# Patient Record
Sex: MALE | Race: WHITE
[De-identification: names, ages, dates, MRNs, and addresses within clinical notes are randomized per-mention and may not be internally consistent; named-entity substitution may affect disease eponyms.]

---

## 2018-02-13 ENCOUNTER — HOSPITAL ENCOUNTER (OUTPATIENT)
Dept: HOSPITAL 80 - FED | Age: 33
Setting detail: OBSERVATION
LOS: 2 days | Discharge: HOME | End: 2018-02-15
Attending: INTERNAL MEDICINE | Admitting: INTERNAL MEDICINE
Payer: COMMERCIAL

## 2018-02-13 DIAGNOSIS — E87.2: Primary | ICD-10-CM

## 2018-02-13 DIAGNOSIS — K76.0: ICD-10-CM

## 2018-02-13 DIAGNOSIS — E87.6: ICD-10-CM

## 2018-02-13 DIAGNOSIS — E86.9: ICD-10-CM

## 2018-02-13 DIAGNOSIS — F10.230: ICD-10-CM

## 2018-02-13 DIAGNOSIS — N17.9: ICD-10-CM

## 2018-02-13 DIAGNOSIS — E80.6: ICD-10-CM

## 2018-02-13 DIAGNOSIS — D72.829: ICD-10-CM

## 2018-02-13 DIAGNOSIS — K70.10: ICD-10-CM

## 2018-02-13 DIAGNOSIS — R55: ICD-10-CM

## 2018-02-13 LAB — PLATELET # BLD: 163 10^3/UL (ref 150–400)

## 2018-02-13 PROCEDURE — 76700 US EXAM ABDOM COMPLETE: CPT

## 2018-02-13 PROCEDURE — G0378 HOSPITAL OBSERVATION PER HR: HCPCS

## 2018-02-13 PROCEDURE — 36556 INSERT NON-TUNNEL CV CATH: CPT

## 2018-02-13 PROCEDURE — 06HM33Z INSERTION OF INFUSION DEVICE INTO RIGHT FEMORAL VEIN, PERCUTANEOUS APPROACH: ICD-10-PCS | Performed by: SURGERY

## 2018-02-13 PROCEDURE — 5A1D70Z PERFORMANCE OF URINARY FILTRATION, INTERMITTENT, LESS THAN 6 HOURS PER DAY: ICD-10-PCS | Performed by: INTERNAL MEDICINE

## 2018-02-13 PROCEDURE — 97161 PT EVAL LOW COMPLEX 20 MIN: CPT

## 2018-02-13 PROCEDURE — G0480 DRUG TEST DEF 1-7 CLASSES: HCPCS

## 2018-02-13 PROCEDURE — 74018 RADEX ABDOMEN 1 VIEW: CPT

## 2018-02-13 PROCEDURE — 93005 ELECTROCARDIOGRAM TRACING: CPT

## 2018-02-13 NOTE — GHP
[f rep st]



                                                            HISTORY AND PHYSICAL





DATE OF ADMISSION:  02/13/2018



CHIEF COMPLAINT:  Feeling poorly.



HISTORY OF PRESENT ILLNESS:  This is a 32-year-old man who comes in after having had a questionable s
eizure at [employer].  History evolved slowly throughout his ER course.  He is currently admitting to [place] to a party on Saturday night, drinking some whiskey, as well as some moonshine.  The day after, he
 started to feel somewhat poorly at night.  He continued to drink some whiskey for a going away party
.  Monday, he was unable to eat or drink and felt quite nauseous.  Today, he was wobbly in [employer] and
 passed out and had a seizure.  He did not, however, bite his tongue or lose control of his bowel or 
bladder.  He is unclear whether he had a true tonic-colonic seizure.  Per ED report, bystander saw conrado morton.  He is told that he has neurocardiogenic syncope.  He has had about 3 episodes in the pas
t.



PAST MEDICAL/SURGICAL HISTORY:  Neurocardiogenic syncope.



MEDICATIONS:  None.



ALLERGIES:  No known drug allergies.



FAMILY HISTORY:  His grandmother has Alzheimer dementia.



SOCIAL HISTORY:  Drinks as above.  He does not smoke.



REVIEW OF SYSTEMS:  A 10-point review of systems is conducted and is negative except per HPI.



PHYSICAL EXAM:  VITAL SIGNS:  Blood pressure is 153/101, heart rate has ranged between 111-139, respi
ration rate is 18, saturating 98% on room air, temperature 36.8.  GENERAL:  The patient is a pleasant
 man who appears quite uncomfortable, somewhat tremulous.  HEENT:  Shows him to be normocephalic, atr
aumatic.  CARDIOVASCULAR:  Shows regular rate and rhythm other than being tachycardic.  No murmurs, r
ubs, or gallops.  PULMONARY:  Shows lungs clear to auscultation bilaterally.  ABDOMEN:  Soft, nontend
er, nondistended.  :  Shows a right femoral line dialysis catheter.  SKIN:  Shows no rash.  NEUROLO
GIC:  Shows him to be alert and oriented x3.  He is moving all extremities.  PSYCHIATRIC:  Shows norm
al mood and affect.



LABS:  White count of 17, hemoglobin is 18.  Lactate is 4.8.  Sodium 131 potassium 3.3.  Initial bica
rb was 6 with a gap of 49.  Initial creatinine was 1.4.  Bilirubin was 9.9, which is down to 8.2.  Th
is is mostly unconjugated.  LFTs were 131 and 88, down to 98 and 73.  Initial lipase was 447.



DATA:  

1.  I discussed this with Dr. Flores.

2.  Reviewed, discussed this with Dr. Ryan.  Will plan on dialysis tonight.

3.  EKG, which I personally viewed and interpreted, shows sinus tachycardia.



IMPRESSION AND PLAN:  This is a 32-year-old man with an ingestion of toxic alcohol. 

1.  Methanol ingestion:  He meets criteria for dialysis.  Femoral catheter was placed by Dr. Daija ochoa.  Dr. Ryan will start dialysis tonight.  He was given a dose of fomepizole in the ED, will continu
e this starting after dialysis and then every 12 hours.  Tox case was open.  The number is [id number].

2.  Significant anion gap acidosis:  Likely toxic alcohol as well as lactate and suspected ketoacidos
is.  Markedly improved with IV fluids.  Should improve further with dialysis.

3.  Hyperbilirubinemia:  This is unconjugated.  It is down a little bit.  This may be Gilbert's in th
e setting of significant stress.  Right upper quadrant ultrasound is pending at this time.

4.  Possible seizure:  At this point, I am not really suspicious of a seizure.  He has many other elina
sons to have had syncope.  I would not start him empirically on antiepileptics.

5.  Leukocytosis:  Think that this is likely stress reaction.  I would not start empiric antibiotics 
at this point.  If this does not resolve tomorrow, would consider further infectious workup.

6.  Macrocytosis:  He may be drinking more than he is attesting to.  He is tremulous now, though I am
 not clear that this is withdrawal.  I will go ahead and place him on CIWA.



BILLING:  I spent 45 minutes at bedside in floor critical care time on this patient.





Job #:  [id number]

## 2018-02-13 NOTE — GOP
[f rep st]



                                                                OPERATIVE REPORT





DATE OF OPERATION:  



SURGEON:  José Cross MD



PREOPERATIVE DIAGNOSIS:  Methanol poisoning.



POSTOPERATIVE DIAGNOSIS:  Methanol poisoning.



PROCEDURE PERFORMED:  Right femoral vein catheterization with ultrasound guidance.



FINDINGS:  





INDICATIONS:  Patient is admitted with methanol poisoning needing dialysis for toxin removal.



DESCRIPTION OF PROCEDURE:  Patient positioned supine.  The groin shaved, prepped with ChloraPrep, river
ped in the usual sterile fashion.  Using full gown and gloves barrier, an ultrasound sterile probe is
 used to identify the femoral vein, which was in its normal medial location, and an 18-gauge thin-wal
led needle punctured this on the 1st pass, allowing guidewire to be introduced.  Serial dilators were
 passed and then the triple-lumen Mahurkar dialysis catheter placed.  All ports were aspirated free o
f blood, flushed with sodium chloride, and caps placed.  The catheter was sutured to the skin with in
terrupted nylon sutures and ChloraPrep again used to clean the site and facilitate a dressing.  The p
atient tolerated the procedure well.





Job #:  [id number]

## 2018-02-13 NOTE — EDPHY
H & P


Constitutional: 


 Initial Vital Signs











Temperature (C)  36.7 C   02/13/18 16:41


 


Heart Rate  143 H  02/13/18 16:41


 


Respiratory Rate  18   02/13/18 16:41


 


Blood Pressure  158/110 H  02/13/18 16:41


 


O2 Sat (%)  95   02/13/18 16:41








 











O2 Delivery Mode               Room Air














Allergies/Adverse Reactions: 


 





No Known Allergies Allergy (Unverified 02/13/18 16:43)


 








Home Medications: 














 Medication  Instructions  Recorded


 


NK [No Known Home Meds]  02/13/18














Medical Decision Making


ED Course/Re-evaluation: 





CHIEF COMPLAINT:  Possible seizure





HISTORY OF PRESENT ILLNESS:  The patient is a 31 y/o male with a history of 

seizures but not on antiepileptic medications arriving via EMS for evaluation 

of a possible seizure this afternoon. He reports his last seizure was in high 

school over 15 years ago. For the last day, he has been nauseated and vomits 

every time he eats. Today he remembers feeling lightheaded and thirsty before 

losing consciousness. Per bystanders to EMS, he was seen "wobbling" while in 

line at [employer]. It's unclear if he fell, had a seizure, or suffered any 

injuries during this time. He currently feels "a little woozy and really 

thirsty." He denies illicit drugs, prescription drugs, or significant alcohol 

use. No fever, chills, myalgias, abdominal pain, diarrhea, cough, cold 

symptoms. 





REVIEW OF SYSTEMS:  





A 10 point review of systems was performed and is negative with the exception 

of the elements mentioned in the history of present illness.





PHYSICAL EXAM:  





, BP, O2 Sat, RR.  Temp noted


General Appearance:  Alert, well hydrated, appropriate, tremulous and 

tachycardic.


Head:  Atraumatic without scalp tenderness or obvious injury


Eyes:  Pupils equal, round, reactive to light and accommodation, EOMI, no trauma

, no injection.


Nose:  Atraumatic, no rhinorrhea, clear.


Throat: Mucus membranes moist.


Neck:  Supple, nontender, no lymphadenopathy.


Respiratory:  No retractions, no distress, no wheezes, and no accessory muscle 

use.  Lungs are clear to auscultation bilaterally.


Cardiovascular:  Tachycardic regular rate and rhythm, no murmurs, rubs, or 

gallops. Good capillary refill all extremities.


Gastrointestinal:  Abdomen is soft, nontender, non-distended, no masses, no 

rebound, no guarding, no peritoneal signs.


Musculoskeletal:  Normal active ROM of all extremities, atraumatic.


Neurological:  Alert, appropriate, and interactive.  The patient has non-focal 

cranial nerves, motor, sensory, and cerebellar exam. Tremulous.


Skin:  No rashes, good turgor, no nodules on palpation.





Past medical history: Seizures, prior hypotension thought related to seizures 

and prescribed salt tablets for a while


Past surgical history: Noncontributory


Family history: Noncontributory


Social history: About to move out of state for a new job. 





DIAGNOSTICS/PROCEDURES/CRITICAL CARE TIME:  





Critical care time spent by me, Dr. Flores, exclusively with this patient was 

45 minutes, exclusive of PA time and exclusive of procedures.  The organ system 

at risk was nervous system. Time spent in assessment and serial reassessments, 

discussion with patient, consideration of interventions, review of labs, 

research of toxic alcohol syndrome and other causes for acidosis, and multiple 

consultations with internal medicine, toxicology, and nephrology.





DIFFERENTIAL DIAGNOSIS:   The differential diagnosis for the patient's seizure 

included but was not limited to methanol/ethylene glycol poisoning, electrolyte 

abnormality, alcohol withdrawal, medication noncompliance, head injury, CNS 

structural abnormality, and break through seizure.





MEDICAL DECISION MAKING:  





This is a 31 y/o male who presents with a 24-hour history of persistent 

vomiting and seizure this afternoon. He denies any history of alcohol abuse, 

prescription medications abuse, or other ingestions. He is tremulous and 

tachycardic around 150-160 upon arrival here. No evidence of CNS depression. 

Presentation is suspicious for alcohol withdrawal despite patient's denials. 

Plan for IV, labs, tox screen, EKG. IV fluids and 1mg IV Ativan administered. 





ISTAT shows abnormal chemistries with Na 132, K 2.9, chloride 88, and anion gap 

significantly elevated at 49. CO2 level is critically low at 6. He has 

hypochloremic and hypokalemic acidosis, but I would expect an alkalosis with 

hypovolemia and dehydration from vomiting. His creatinine is elevated at 1.4 

indicating renal insufficiency, his BGL is 153. Serum osmolality ordered. 





Serum osmolality is 293. My calculated osmolality gap is high at 17, which is 

concerning for possible methanol/ethylene glycol overdose. LFTs and bilirubin 

elevated. Aspirin, acetaminophen, EtOH levels pending.





1755: Reassessed patient and discussed lab results and my concern for toxic 

alcohol use. He reports consuming a few drinks of liquor during happy hour on 

Monday for a going away party. Consumed store-bought rum and some whiskey, 

denies toxic alcohol intake like moonshine, antifreeze, or any other 

ingestions. He has been vomiting since Monday. He had a mild headache earlier 

that has resolved. He denies abdominal pain. No history of diabetes. Continues 

to deny alcohol abuse. He continues to be tremulous and tachycardic on exam. 





1803: Consulted with Dr. Santos, hospitalist. Plan for venous lactate, repeat 

chemistries, US of gallbladder and liver, and admission. Dr. Santos accepts 

admission for severe anion gap metabolic acidosis, bilirubinemia, elevated 

liver enzymes, dehydration, seizure, hypochloremia, hypokalemia.





1809: Patient admitted to RN that he drank a roge jar full of moonshine 

Saturday and has consumed whiskey every day since then. No vision symptoms. He 

has likely partially treated his methanol poisoning with ongoing alcohol 

consumption.  and hospitalist paged.





1815: Ethanol, acetaminophen, aspirin levels are negative. 





1817: Consulted with Poison Control. Their toxicology fellow will call me back.





1820: Consulted with Dr. Jax Avila, . Case #[id number]. He advises 

Fomepizole with loading dose of 15mg/kg and dialysis to eliminate formic acid. 

Then 15mg/kg ever 12 hours afterwards until methanol level comes back below 30. 

Nephrology paged. Ethylene glycol and methanol serum levels ordered, though 

these will be send-out labs. 





1846: Consulted with Dr. Froylan Ryan, nephrology. He request placement of a 

dialysis catheter and ICU placement for emergency dialysis. 





1850: Consulted with Dr. Cross, surgeon. He will place ICU catheter 

upstairs. 





1858: Updated Dr. Santos on patient condition.





- Data Points


Laboratory Results: 


 Laboratory Results





 02/13/18 16:40 





 02/13/18 16:40 





 











  02/13/18 02/13/18 02/13/18





  16:40 16:40 16:40


 


WBC      





    


 


RBC      





    


 


Hgb      





    


 


POC Hgb      





    


 


Hct      





    


 


POC Hct      





    


 


MCV      





    


 


MCH      





    


 


MCHC      





    


 


RDW      





    


 


Plt Count      





    


 


MPV      





    


 


Neut % (Auto)      





    


 


Lymph % (Auto)      





    


 


Mono % (Auto)      





    


 


Eos % (Auto)      





    


 


Baso % (Auto)      





    


 


Nucleat RBC Rel Count      





    


 


Absolute Neuts (auto)      





    


 


Absolute Lymphs (auto)      





    


 


Absolute Monos (auto)      





    


 


Absolute Eos (auto)      





    


 


Absolute Basos (auto)      





    


 


Absolute Nucleated RBC      





    


 


Immature Gran %      





    


 


Immature Gran #      





    


 


POC Sodium      





    


 


Sodium      136 mEq/L mEq/L





     (135-145) 


 


POC Potassium      





    


 


Potassium      3.3 mEq/L L mEq/L





     (3.5-5.2) 


 


POC Chloride      





    


 


Chloride      81 mEq/L L mEq/L





     () 


 


Carbon Dioxide      6 mEq/l L* mEq/l





     (22-31) 


 


Anion Gap      49 mEq/L H mEq/L





     (8-16) 


 


POC BUN      





    


 


BUN      9 mg/dL mg/dL





     (7-23) 


 


Creatinine      1.4 mg/dL H mg/dL





     (0.7-1.3) 


 


POC Creatinine      





    


 


Estimated GFR      58 





    


 


Glucose      153 mg/dL H mg/dL





     () 


 


POC Glucose      





    


 


Serum Osmolality      293 mosmo/kg mosmo/kg





     (280-297) 


 


Calcium      10.4 mg/dL mg/dL





     (8.5-10.4) 


 


Total Bilirubin      9.9 mg/dL H mg/dL





     (0.1-1.4) 


 


Conjugated Bilirubin      2.8 mg/dL H mg/dL





     (0.0-0.5) 


 


Unconjugated Bilirubin      7.1 mg/dL H mg/dL





     (0.0-1.1) 


 


AST      131 IU/L H IU/L





     (17-59) 


 


ALT      88 IU/L H IU/L





     (21-72) 


 


Alkaline Phosphatase      128 IU/L H IU/L





     () 


 


Total Protein      9.0 g/dL H g/dL





     (6.3-8.2) 


 


Albumin      5.9 g/dL H g/dL





     (3.5-5.0) 


 


Lipase      447 IU/L H IU/L





     () 


 


Salicylates    < 1.0 mg/dL L mg/dL  





    (2.0-20.0)  


 


Acetaminophen    < 10 mcg/mL L mcg/mL  





    (10-30)  


 


Ethyl Alcohol      < 10 mg/dL mg/dL





     (0-10) 


 


Methyl Alcohol, Quant  Pending     





    














  02/13/18 02/13/18





  16:40 16:36


 


WBC  17.07 10^3/uL H 10^3/uL  





   (3.80-9.50)  


 


RBC  5.19 10^6/uL 10^6/uL  





   (4.40-6.38)  


 


Hgb  18.6 g/dL H g/dL  





   (13.7-17.5)  


 


POC Hgb    19.4 gm/dL H gm/dL





    (13.7-17.5) 


 


Hct  54.2 % H %  





   (40.0-51.0)  


 


POC Hct    57 % H %





    (40-51) 


 


MCV  104.4 fL H fL  





   (81.5-99.8)  


 


MCH  35.8 pg H pg  





   (27.9-34.1)  


 


MCHC  34.3 g/dL g/dL  





   (32.4-36.7)  


 


RDW  13.3 % %  





   (11.5-15.2)  


 


Plt Count  163 10^3/uL 10^3/uL  





   (150-400)  


 


MPV  11.5 fL fL  





   (8.7-11.7)  


 


Neut % (Auto)  71.3 % %  





   (39.3-74.2)  


 


Lymph % (Auto)  16.2 % %  





   (15.0-45.0)  


 


Mono % (Auto)  10.0 % %  





   (4.5-13.0)  


 


Eos % (Auto)  0.2 % L %  





   (0.6-7.6)  


 


Baso % (Auto)  0.8 % %  





   (0.3-1.7)  


 


Nucleat RBC Rel Count  0.4 % H %  





   (0.0-0.2)  


 


Absolute Neuts (auto)  12.18 10^3/uL H 10^3/uL  





   (1.70-6.50)  


 


Absolute Lymphs (auto)  2.76 10^3/uL 10^3/uL  





   (1.00-3.00)  


 


Absolute Monos (auto)  1.70 10^3/uL H 10^3/uL  





   (0.30-0.80)  


 


Absolute Eos (auto)  0.04 10^3/uL 10^3/uL  





   (0.03-0.40)  


 


Absolute Basos (auto)  0.13 10^3/uL H 10^3/uL  





   (0.02-0.10)  


 


Absolute Nucleated RBC  0.06 10^3/uL H 10^3/uL  





   (0-0.01)  


 


Immature Gran %  1.5 % H %  





   (0.0-1.1)  


 


Immature Gran #  0.26 10^3/uL H 10^3/uL  





   (0.00-0.10)  


 


POC Sodium    132 mEq/L L mEq/L





    (135-145) 


 


Sodium    





   


 


POC Potassium    2.9 mEq/L L mEq/L





    (3.3-5.0) 


 


Potassium    





   


 


POC Chloride    88 mEq/L L mEq/L





    () 


 


Chloride    





   


 


Carbon Dioxide    





   


 


Anion Gap    





   


 


POC BUN    10 mg/dL mg/dL





    (7-23) 


 


BUN    





   


 


Creatinine    





   


 


POC Creatinine    1.4 mg/dL H mg/dL





    (0.7-1.3) 


 


Estimated GFR    





   


 


Glucose    





   


 


POC Glucose    156 mg/dL H mg/dL





    () 


 


Serum Osmolality    





   


 


Calcium    





   


 


Total Bilirubin    





   


 


Conjugated Bilirubin    





   


 


Unconjugated Bilirubin    





   


 


AST    





   


 


ALT    





   


 


Alkaline Phosphatase    





   


 


Total Protein    





   


 


Albumin    





   


 


Lipase    





   


 


Salicylates    





   


 


Acetaminophen    





   


 


Ethyl Alcohol    





   


 


Methyl Alcohol, Quant    





   











Medications Given: 


 








Discontinued Medications





Sodium Chloride (Ns)  1,000 mls @ 0 mls/hr IV EDNOW ONE; Wide Open


   PRN Reason: Protocol


   Stop: 02/13/18 16:43


   Last Admin: 02/13/18 16:44 Dose:  1,000 mls


Sodium Chloride (Ns)  1,000 mls @ 0 mls/hr IV EDNOW ONE; Wide Open


   PRN Reason: Protocol


   Stop: 02/13/18 16:43


   Last Admin: 02/13/18 16:44 Dose:  1,000 mls


Lorazepam (Ativan Injection)  1 mg IVP EDNOW ONE


   Stop: 02/13/18 16:55


   Last Admin: 02/13/18 16:58 Dose:  1 mg


Ondansetron HCl (Zofran)  4 mg IVP EDNOW ONE


   Stop: 02/13/18 16:43


   Last Admin: 02/13/18 16:44 Dose:  Not Given


Potassium Chloride (Klor Packets)  40 meq PO EDNOW ONE


   Stop: 02/13/18 16:54


   Last Admin: 02/13/18 16:58 Dose:  40 meq





Point of Care Test Results: 


 











  02/13/18





  16:36


 


POC Sodium  132 L


 


POC Potassium  2.9 L


 


POC Chloride  88 L


 


POC BUN  10


 


POC Creatinine  1.4 H


 


POC Glucose  156 H














Departure





- Departure


Disposition: [facility]


Clinical Impression: 


 Seizure, Hypochloremia, Acidosis, metabolic, Hypokalemia, Elevated LFTs, Renal 

insufficiency





Methanol poisoning


Qualifiers:


 Encounter type: initial encounter Injury intent: accidental or unintentional 

Qualified Code(s): T51.1X1A - Toxic effect of methanol, accidental (

unintentional), initial encounter





Condition: Critical


Report Scribed for: Puma Flores


Report Scribed by: Marylu Allred


Date of Report: 02/13/18


Time of Report: 17:53

## 2018-02-13 NOTE — CPEKG
Heart Rate: 140

RR Interval: 429

P-R Interval: 100

QRSD Interval: 88

QT Interval: 312

QTC Interval: 476

P Axis: 46

QRS Axis: -22

T Wave Axis: 10

EKG Severity - ABNORMAL ECG -

EKG Impression: SINUS TACHYCARDIA

EKG Impression: BORDERLINE LEFT AXIS DEVIATION

EKG Impression: CONSIDER POSTERIOR INFARCT

EKG Impression: BORDERLINE PROLONGED QT INTERVAL

Electronically Signed By: Puma Flores 13-Feb-2018 19:58:45

## 2018-02-13 NOTE — POSTOPPROG
Post Op Note


Date of Operation: 02/13/18


Surgeon: José Cross


Anesthesia: Local (Specify)


Pre-op Diagnosis: acidosis, methanol poisoning


Post-op Diagnosis: same


Indication: same


Procedure: r femoral vein dialysis catheter placement


Findings: normal anantomy


Inf/Abcess present in the surg proc area at time of surgery?: No


EBL: Minimal

## 2018-02-14 LAB — PLATELET # BLD: 85 10^3/UL (ref 150–400)

## 2018-02-14 RX ADMIN — THIAMINE HYDROCHLORIDE SCH MLS: 100 INJECTION, SOLUTION INTRAMUSCULAR; INTRAVENOUS at 09:16

## 2018-02-14 RX ADMIN — DEXMEDETOMIDINE HYDROCHLORIDE SCH MLS: 4 INJECTION, SOLUTION INTRAVENOUS at 09:19

## 2018-02-14 RX ADMIN — DEXMEDETOMIDINE HYDROCHLORIDE SCH MLS: 4 INJECTION, SOLUTION INTRAVENOUS at 03:16

## 2018-02-14 NOTE — HOSPPROG
Hospitalist Progress Note


Assessment/Plan: 





33 yo M w alcoholism admitted w seizure vs syncope and concern for methanol 

poisoning





methanol poisoning: levels negative


   has been treated


   received fomepazole and dialysis


   dc HD catheter





syncope vs seizure: h/o neurocardiogenic syncope


   suspect this was syncope as alcohol withdrawal isnt paarticularly bad


   follow





metabolic acidosis: suspect alcoholic vs starvation ketosis





proph: add lmwh


   





alcoholic hepatitis: rec cessation





dispo: inpt


Subjective: somnolent but arousable.  case d/w dr linda


Objective: 


 Vital Signs











Temp Pulse Resp BP Pulse Ox


 


 37.2 C   99   24 H  112/73   100 


 


 02/14/18 03:46  02/14/18 12:00  02/14/18 12:00  02/14/18 12:00  02/14/18 12:00








 Laboratory Results





 02/14/18 03:30 





 02/14/18 03:30 





 











 02/13/18 02/14/18 02/15/18





 05:59 05:59 05:59


 


Intake Total  2999.4 


 


Output Total  0 600


 


Balance  2999.4 -600














- Physical Exam


Constitutional: no apparent distress, appears nourished


Eyes: PERRL, icteric sclera


Ears, Nose, Mouth, Throat: moist mucous membranes, hearing normal


Cardiovascular: regular rate and rhythym, no murmur, rub, or gallop


Respiratory: no respiratory distress, no rales or rhonchi


Gastrointestinal: normoactive bowel sounds, soft, non-tender abdomen


Genitourinary: No mancia in urethra


Skin: warm, normal color


Musculoskeletal: full muscle strength, no muscle tenderness


Neurologic: AAOx3


Psychiatric: interacting appropriately





ICD10 Worksheet


Patient Problems: 


 Problems











Problem Status Onset


 


Acidosis, metabolic Acute  


 


Elevated LFTs Acute  


 


Hypochloremia Acute  


 


Hypokalemia Acute  


 


Methanol poisoning Acute  


 


Renal insufficiency Acute  


 


Seizure Acute

## 2018-02-14 NOTE — SOAPPROG
SOAP Progress Note


Assessment/Plan: 


Assessment:


1. AGMA. 


Mostly resolved with IVF yesterday. 


Alcohol screen negative for ethanol, methanol, await ethylene glycol.


AG 10, HCO3 32. serum osm normal, 280.


Anticipate d/c dialysis catheter later this am pending EG level.


May have had starvation ketosis.


2. PETER.


Mild, resolved, likely due to volume depletion. 


Bladder scan later this am, I&O cath if pt unable to void.


3. FEN. Likely d/t poor intake.


Replace low K, Phos. 


4. Transaminitis


Likely EtOH related.


5. EtOH withdrawal.


[facility] protocol. 


























Plan:





02/14/18 07:29





02/14/18 07:31





02/14/18 07:34





02/14/18 07:35





Subjective: 





On precedex for combativeness over night.


Had dialysis last night.


No complaints.


Denies drinking any antifreeze, rubbing alcohol or anything other than moonshine

, whiskey, gatorade and water.


Objective: 





 Vital Signs











Temp Pulse Resp BP Pulse Ox


 


 37.2 C   100   25 H  107/65   93 


 


 02/14/18 03:46  02/14/18 07:00  02/14/18 07:00  02/14/18 07:00  02/14/18 07:00








 Laboratory Results





 02/14/18 03:30 





 02/14/18 03:30 





 











 02/13/18 02/14/18 02/15/18





 05:59 05:59 05:59


 


Intake Total  2999.4 


 


Output Total  0 


 


Balance  2999.4 








Lethargic but arousable.


Answers questions appropriately.


Knows he is in the hospital


No tremors


RRR, no m/g/r


CTAB


Abdom soft, nontender


No edema


: 600cc UOP on I&O cath





ICD10 Worksheet


Patient Problems: 


 Problems











Problem Status Onset


 


Acidosis, metabolic Acute  


 


Elevated LFTs Acute  


 


Hypochloremia Acute  


 


Hypokalemia Acute  


 


Methanol poisoning Acute  


 


Renal insufficiency Acute  


 


Seizure Acute

## 2018-02-14 NOTE — GCON
[f 
rep st]



                                                                    CONSULTATION





NEPHROLOGY CONSULTATION



DATE OF CONSULTATION:  02/13/2018



REASON FOR CONSULTATION:  Presumed methanol intoxication.



HISTORY OF PRESENT ILLNESS:  I have been asked to evaluate the patient 
regarding his metabolic acidosis and presumed methanol intoxication.  He is a 32
-year-old gentleman with very little in the way of past medical history.  He 
was brought to the ER today after an episode of unsteadiness that culminated in 
possible seizure activity.  This was witnessed by his friends.  When he was 
evaluated in the emergency room, he was found to have a serum bicarbonate of 6 
and an anion gap of 49.  Upon additional questioning, he admitted to drinking 
some moonshine 3 days ago, followed by significant ethanol consumption over the 
following 2-3 days.  His last alcoholic drink was yesterday.  Yesterday evening
, he began feeling poorly, mainly manifested by GI distress.  He denies any 
visual changes.  Today, he had the aforementioned unsteadiness and possible 
seizure activity resulting in his ER visit.  Toxicology was consulted from the 
emergency room, and they recommended fomepizole and dialysis.  General Surgery 
has been kind enough to place a temporary hemodialysis catheter and dialysis is 
scheduled to begin within the next few minutes.  He has been hemodynamically 
stable.  He did receive some normal saline in the emergency room, and actually 
as I am dictating this, repeat labs have resulted, which reveal a significant 
improvement in his bicarbonate level to 19 with a decrease in his anion gap to 
21.  A serum osmolality sent from the ER was 293; this results in a calculated 
osmolar gap of only 9.



PAST MEDICAL HISTORY:  What sounds like an elbow abscess that required drainage.



ALLERGIES:  No known drug allergies.



ADMISSION MEDICATIONS:  None.



SOCIAL HISTORY:  He is an active drinker but is a nonsmoker and denies illicit 
drug use.  He works in [employer] and is originally from [place].  
He has lived in [place] for approximately the past 4 years.



FAMILY HISTORY:  No family history of renal disease.  There is some GI disease 
in the family.



REVIEW OF SYSTEMS:  Positive for some unsteadiness and tremulousness.  Positive 
for recent GI distress manifested primarily by eructation.  He denies any 
actual vomiting or diarrhea.  He denies any shortness of breath or chest pain.  
He denies any paresthesias.  Aside from other positives noted in the HPI, the 
remainder of 10-organ system review is negative.



PHYSICAL EXAMINATION:  VITAL SIGNS:  Blood pressure 153/101, heart rate is in 
the 130s, oxygenation is 98% on room air.  HEENT:  Sclerae anicteric; 
conjunctivae are injected.  Oral mucosa is moist; oropharynx is clear.  NECK:  
Supple without JVD or lymphadenopathy.  There are no carotid bruits.  LUNGS:  
Clear to auscultation with exception of an occasional scattered rhonchi.  BACK:
  No CVA tenderness.  HEART:  Tachycardic, but regular rate with no murmurs, 
gallops, or rubs.  ABDOMEN:  Quite soft and nontender.  Bowel sounds are 
hypoactive.  I do not appreciate any hepatosplenomegaly, masses, or bruits.  
EXTREMITIES:  There is no lower extremity edema.  His feet are warm and appear 
well perfused.  Pedal pulses are palpable bilaterally.  SKIN:  There are no 
skin rashes, and there is no mottling.  NEURO:  He is awake, alert, and 
appropriate.  He is perhaps slightly tremulous, but there is no asterixis.  :
  Lomeli catheter is absent.



LABORATORY DATA:  On presentation:  Sodium 136, potassium 3.3, chloride 81, CO2 
of 6, BUN 9, creatinine 1.4, glucose 153, calcium 10.4.  Total bilirubin 9.9, 
, ALT 88, alkaline phosphatase 128, albumin 5.9, total protein 9.0, 
lipase 447.  Repeat labs at 1920:  Sodium 131, potassium 3.3, chloride 91, CO2 
of 19, BUN 10, creatinine 0.9, glucose 145, calcium 9.0.  Total bilirubin 8.2, 
albumin 4.2, total protein 6.9.  Venous lactate was 4.8.  White blood cell 
count is 17.1, hemoglobin 18.6, platelets 163.  Ethanol level was 0.  
Salicylate and Tylenol levels were also 0.  Ethylene glycol and methanol levels 
are pending.  Serum osmolality in the ER was 293.



IMPRESSION AND PLAN:  

1.  Presumed methanol ingestion:  In light of his severe anion gap metabolic 
acidosis, we will perform dialysis this evening.  Dr. Cross has been kind 
enough to already have placed temporary dialysis catheter, and dialysis will 
begin shortly.  We will plan a 4-hour treatment tonight.  My assumption at this 
point is that he will not require an additional treatment tomorrow.  The normal 
osmolal gap suggests that any toxic alcohols have already been metabolized.  My 
expectation is that 1 single dialysis session will effectively treat his anion 
gap acidosis and remove any formic acid that may still be present.  We will 
check a venous pH prior to initiating dialysis and a repeat methanol level 
after dialysis is complete.  Unfortunately, given the time delay in getting 
these results, it will likely be tomorrow sometime before it is known with 
certainty whether or not he will require additional dialysis.  His dialysis 
catheter should remain in place until this is completely clear.  He was given 
fomepizole in the emergency room; this should be re-dosed after dialysis and 
then q.12 hours.  My assumption at this time is that he may not require 
additional doses following his second dose tonight.  

2.  Metabolic acidosis:  His initial labs were certainly consistent with toxic 
alcohol ingestion, though the market improvement following saline 
administration in the emergency room suggests that there may have been a 
component of either lactic acidosis as result of seizure activity or starvation 
ketosis.  Nonetheless, given his history, I think it is prudent to dialyze him 
as stated above.  I have requested 2 additional amps of sodium bicarbonate to 
be given while we are waiting the start of dialysis.  As noted above, I am 
optimistic that he will not require repeat dialysis treatment tomorrow. 



Thank you for the consultation.  We will follow with you.





Job #:  [id number]

MTDD

## 2018-02-14 NOTE — ASMTCASEMG
Living Arrangements

 

What is your living           Answers:  Alone                                 

arrangement? Who do you                                                       

live with?                                                                    

Type Of Residence

 

What kind of residence do     Answers:  Apartment                             

you live in?                                                                  

Discharge Plan Comments

 

Coordination Status           

Comments                      

Notes:

Patient is a 33yo single male who had a seizure after drinking moonshine and whiskey. Poison 

control was contacted and patient met criteria for 2 doses of antidote. Patient will start 

dialysis. Patient on CIWA protocol.  Patient was combative overnight. Will complete CAGE and give 

resources when patient is feeling better. CM will follow.

 

Date Signed:  02/14/2018 03:41 PM

Electronically Signed By:Mely Camara LCSW

## 2018-02-14 NOTE — GCON
[f 
rep st]



                                                                    CONSULTATION





PULMONARY/CRITICAL CARE CONSULTATION



DATE OF CONSULTATION:  02/14/2018



REFERRING PHYSICIAN:  Alfred Torres MD





REASON FOR REFERRAL:  Evaluation and management of seizure, and anion gap 
metabolic acidosis with alcohol withdrawal.



HISTORY:  The patient is a 32-year-old male who was admitted yesterday after 
having a possible seizure.  He apparently has a history of drinking, although 
the extent of this is unknown.  About 5 days ago, he went to a party on 
Saturday night and was drinking some moonshine as well as whiskey.  The day 
after he started to feel somewhat poorly at night, but continued to drink, 
drinking some whiskey.  By about 3 days ago, he was unable to drink or eat much 
as he was quite nauseated.  He went to [employer] to get some antacids and was a 
bit wobbly and passed out.  There was apparently a seizure seen by witnesses, 
but it was unclear if this is true tonic-clonic seizure.  He was admitted to 
the hospital after being found to have a severe anion gap metabolic acidosis.  
He was treated with fomepizole and dialysis for possible methanol poisoning.  
This has been completed, and his electrolyte abnormalities have been corrected.
  Overnight he started to have some tremors and it was felt that he was having 
some alcohol withdrawal, so he was started on Precedex and has been receiving 
frequent doses of IV Ativan.  He currently is somnolent but arousable and is 
not able to add anything to the history.  He is able to recognize people in the 
room.  He denies nausea, vomiting, tremors or hallucinations currently.



PAST MEDICAL HISTORY:  Neurocardiogenic syncope.  This was evaluated when he 
was a child, including a tilt-table test during which "his heart stopped," 
according to his mother.  It is not clear if he has been having any of these 
events recently.



MEDICATIONS:  None.



ALLERGIES:  None.



SOCIAL HISTORY:  Patient drinks, although the extent is unknown.  His family is 
from [place], and he was planning on moving back to [place] in the next few 
weeks.



REVIEW OF SYSTEMS:  A complete review of systems is unobtainable, as the 
patient is not able to give accurate verbal responses.



PHYSICAL EXAM:  GENERAL:  Patient is somnolent but arousable.  He is 
inconsistently answering questions, but smiles appropriately.  VITAL SIGNS:  
Blood pressure is 126/84, with a heart rate of 94.  He is afebrile.  Oxygen 
saturations are 99% on 2 L.  HEENT:  Normocephalic and atraumatic.  No icterus.
  NECK:  No JVD.  Trachea is midline.  CHEST:  Clear to auscultation.  CARDIAC:
  Regular rate and rhythm without murmur.  ABDOMEN:  Soft, nontender.  Bowel 
sounds are present.  EXTREMITIES:  No clubbing, cyanosis, or edema.  NEURO:  
The patient is somnolent but arousable.  He has no gross motor or sensory 
deficits, and has just a very mild tremor



LABORATORY:  White blood count is 8.0, down from 17.1.  Hemoglobin is 14.5, 
down from 19.4.  A platelet count is 85, down from 163.  



A chemistry group shows a potassium of 3.9, up from 3.3.  Carbon dioxide is 32, 
up from 6.  A creatinine 0.7.  A glucose is 90.  A calcium is 8.1.  A magnesium 
is 1.8.  A bilirubin is 7, down from 9.9.  An AST is 91, down from 131.  A 
lipase is 328, down from 447.  A lactate is 1.0, down from 4.8 last night.  



A venous blood gas shows a pH of 7.51 with a pCO2 of 30, and a bicarbonate of 
29.  An anion gap of 10, down from 49.  A measured serum osmolality is 280, 
down from 293.  



Tox screen is negative for salicylates and acetaminophen.  Ethanol level is 
less than 10 and methanol, propylene glycol and ethylene glycol levels are all 
negative.  



Abdominal x-ray shows a right femoral line.  Images reviewed by me.



ASSESSMENT:  

1.  Severe anion gap metabolic acidosis.  There was a concern for methanol 
toxicity and the patient was appropriately treated.  However, he is not having 
any apparent sequela from methanol and there is no clear history of this.  I 
think that the severe metabolic acidosis and severe anion gap metabolic 
acidosis could be explained by his "starvation" as well as alcohol intake.  
Presume that ketones or beta hydroxybutyrate would have been markedly elevated, 
but they were not checked.  Nonetheless, his anion gap metabolic acidosis is 
corrected with fluids, as well as dialysis and sodium bicarbonate.  I expect it 
will continue to remain normal if we are able to get him to start eating.  

2.  Possible alcohol withdrawal.  The patient has had some tremor this morning.
  It is unclear if he is a regular heavy alcohol use or binge drinker.  He has 
been started on Precedex and Ativan.  I am hopeful the Ativan can be weaned and 
we can switch him to oral benzodiazepines with as needed intravenous 
benzodiazepine.

3.  Mild pancreatitis.

4.  Hypokalemia.



RECOMMENDATIONS:  Start to advance p.o.  Stop Precedex.  We will adjust 
benzodiazepine dosing if the patient has increased symptoms.  The patient will 
be started on potassium protocol.  His laboratory results will be followed.  I 
do not anticipate further need for dialysis.





Job #:  [id number]

MTDD

## 2018-02-15 VITALS — SYSTOLIC BLOOD PRESSURE: 133 MMHG | RESPIRATION RATE: 20 BRPM | DIASTOLIC BLOOD PRESSURE: 76 MMHG | TEMPERATURE: 98.5 F

## 2018-02-15 VITALS — OXYGEN SATURATION: 97 % | HEART RATE: 120 BPM

## 2018-02-15 RX ADMIN — THIAMINE HYDROCHLORIDE SCH MLS: 100 INJECTION, SOLUTION INTRAMUSCULAR; INTRAVENOUS at 10:34

## 2018-02-15 NOTE — HOSPPROG
Hospitalist Progress Note


Assessment/Plan: 





31 yo M w alcoholism admitted w seizure vs syncope and concern for methanol 

poisoning





methanol poisoning: levels negative


   has been treated


   received fomepazole and dialysis


   dc HD catheter





syncope vs seizure: h/o neurocardiogenic syncope


   suspect this was syncope as alcohol withdrawal isnt paarticularly bad


   follow





metabolic acidosis: suspect alcoholic vs starvation ketosis





proph: add lmwh


   





alcoholic hepatitis: rec cessation





dispo: home today


   > 30 minutes


Subjective: not in withdrawal


Objective: 


 Vital Signs











Temp Pulse Resp BP Pulse Ox


 


 36.9 C   92   18   129/97 H  96 


 


 02/15/18 07:51  02/15/18 07:51  02/15/18 07:51  02/15/18 07:51  02/15/18 07:51








 Laboratory Results





 02/14/18 03:30 





 02/15/18 05:20 





 











 02/14/18 02/15/18 02/16/18





 05:59 05:59 05:59


 


Intake Total 2999.4 922.4 


 


Output Total 0 600 


 


Balance 2999.4 322.4 














- Physical Exam


Constitutional: no apparent distress, appears nourished


Eyes: PERRL, anicteric sclera


Ears, Nose, Mouth, Throat: moist mucous membranes, hearing normal


Cardiovascular: regular rate and rhythym, no murmur, rub, or gallop


Respiratory: no respiratory distress, no rales or rhonchi, clear to auscultation


Gastrointestinal: normoactive bowel sounds, soft, non-tender abdomen


Genitourinary: no bladder fullness, No mancia in urethra


Skin: warm, normal color


Musculoskeletal: full muscle strength, no muscle tenderness


Neurologic: AAOx3


Psychiatric: interacting appropriately





ICD10 Worksheet


Patient Problems: 


 Problems











Problem Status Onset


 


Acidosis, metabolic Acute  


 


Elevated LFTs Acute  


 


Hypochloremia Acute  


 


Hypokalemia Acute  


 


Methanol poisoning Acute  


 


Renal insufficiency Acute  


 


Seizure Acute

## 2018-02-15 NOTE — GDS
[f rep st]



                                                             DISCHARGE SUMMARY





DISCHARGE DIAGNOSES:  

1.  Possible syncope with negative workup.  

2.  Alcoholic ketoacidosis.

3.  Alcoholism with alcoholic hepatitis.  

4.  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



HOSPITAL COURSE:  Please see admission history and physical by Dr. Yamil Santos.  Patient present
ed with syncope, history with anion gap acidosis and a story of having drank moonshine.  There was co
ncern this represented methanol poisoning.  Methanol levels were ultimately negative.  He did receive
 dialysis before they were back.  Ultrasound showed fatty infiltration of the liver.  He had elevated
 lipase without clinical pancreatitis.  He had mild alcohol withdrawal that he was treated with CIWA.
  He was discharged home with recommendations for cessation.  His parents flew in from [place] and 
were present.  Their questions were answered at the time of discharge.





Job #:  [id number]

## 2018-02-15 NOTE — SOAPPROG
SOAP Progress Note


Assessment/Plan: 


Assessment:


1. AGMA. 


Resolved. 


Alcohol screen negative for ethanol, methanol, ethylene glycol.


Likely d/t alcohol/starvation ketosis.


2. PETER.


Mild, resolved, likely due to volume depletion. 


3. Transaminitis


Likely EtOH related.


Advised EtOH avoidance in the future.





Will sign off.  


























Plan:





02/14/18 07:29





02/14/18 07:31





02/14/18 07:34





02/14/18 07:35





02/15/18 10:37





Subjective: 





Denies nausea, abdominal pain, sob or other complaints. Parents are here from 

[place].


Objective: 





 Vital Signs











Temp Pulse Resp BP Pulse Ox


 


 36.9 C   92   18   129/97 H  96 


 


 02/15/18 07:51  02/15/18 07:51  02/15/18 07:51  02/15/18 07:51  02/15/18 07:51








 Laboratory Results





 02/14/18 03:30 





 02/15/18 05:20 





 











 02/14/18 02/15/18 02/16/18





 05:59 05:59 05:59


 


Intake Total 2999.4 922.4 


 


Output Total 0 600 


 


Balance 2999.4 322.4 








comfortable wm, in chair


Breathing comfortably


Abdom soft, nontender


No LE edema





ICD10 Worksheet


Patient Problems: 


 Problems











Problem Status Onset


 


Acidosis, metabolic Acute  


 


Elevated LFTs Acute  


 


Hypochloremia Acute  


 


Hypokalemia Acute  


 


Methanol poisoning Acute  


 


Renal insufficiency Acute  


 


Seizure Acute

## 2018-02-15 NOTE — ASMTCMCOM
CM Note

 

CM Note                       

Notes:

Spoke w/RN, pt will return home to [facility] with parents who are here now to help him. He was 

cleared by PT, CM available for any changes.



DC Plan: Independent

 

Date Signed:  02/15/2018 01:33 PM

Electronically Signed By:Danae Mistry RN